# Patient Record
Sex: FEMALE | Race: WHITE
[De-identification: names, ages, dates, MRNs, and addresses within clinical notes are randomized per-mention and may not be internally consistent; named-entity substitution may affect disease eponyms.]

---

## 2017-04-06 ENCOUNTER — HOSPITAL ENCOUNTER (INPATIENT)
Dept: HOSPITAL 84 - D.ER | Age: 72
LOS: 4 days | Discharge: HOME | DRG: 189 | End: 2017-04-10
Admitting: FAMILY MEDICINE
Payer: MEDICARE

## 2017-04-06 VITALS — DIASTOLIC BLOOD PRESSURE: 44 MMHG | SYSTOLIC BLOOD PRESSURE: 104 MMHG

## 2017-04-06 VITALS — BODY MASS INDEX: 31.4 KG/M2 | BODY MASS INDEX: 31.5 KG/M2

## 2017-04-06 DIAGNOSIS — F32.9: ICD-10-CM

## 2017-04-06 DIAGNOSIS — E87.6: ICD-10-CM

## 2017-04-06 DIAGNOSIS — E11.65: ICD-10-CM

## 2017-04-06 DIAGNOSIS — K59.09: ICD-10-CM

## 2017-04-06 DIAGNOSIS — J44.0: ICD-10-CM

## 2017-04-06 DIAGNOSIS — J96.21: Primary | ICD-10-CM

## 2017-04-06 DIAGNOSIS — J18.1: ICD-10-CM

## 2017-04-06 DIAGNOSIS — Z99.81: ICD-10-CM

## 2017-04-06 DIAGNOSIS — J44.1: ICD-10-CM

## 2017-04-06 DIAGNOSIS — K21.9: ICD-10-CM

## 2017-04-06 DIAGNOSIS — F17.203: ICD-10-CM

## 2017-04-06 LAB
ALBUMIN SERPL-MCNC: 3.7 G/DL (ref 3.4–5)
ALP SERPL-CCNC: 76 U/L (ref 46–116)
ALT SERPL-CCNC: 15 U/L (ref 10–68)
ANION GAP SERPL CALC-SCNC: 8.4 MMOL/L (ref 8–16)
BASOPHILS NFR BLD AUTO: 0.1 % (ref 0–2)
BILIRUB SERPL-MCNC: 0.24 MG/DL (ref 0.2–1.3)
BUN SERPL-MCNC: 10 MG/DL (ref 7–18)
CALCIUM SERPL-MCNC: 8.8 MG/DL (ref 8.5–10.1)
CHLORIDE SERPL-SCNC: 97 MMOL/L (ref 98–107)
CO2 SERPL-SCNC: 34.7 MMOL/L (ref 21–32)
CREAT SERPL-MCNC: 0.8 MG/DL (ref 0.6–1.3)
EOSINOPHIL NFR BLD: 0.2 % (ref 0–7)
ERYTHROCYTE [DISTWIDTH] IN BLOOD BY AUTOMATED COUNT: 13.2 % (ref 11.5–14.5)
GLOBULIN SER-MCNC: 3.1 G/L
GLUCOSE SERPL-MCNC: 103 MG/DL (ref 74–106)
HCT VFR BLD CALC: 39.5 % (ref 36–48)
HGB BLD-MCNC: 13.1 G/DL (ref 12–16)
IMM GRANULOCYTES NFR BLD: 0.2 % (ref 0–5)
LYMPHOCYTES NFR BLD AUTO: 10.9 % (ref 15–50)
MCH RBC QN AUTO: 30.2 PG (ref 26–34)
MCHC RBC AUTO-ENTMCNC: 33.2 G/DL (ref 31–37)
MCV RBC: 91 FL (ref 80–100)
MONOCYTES NFR BLD: 8.4 % (ref 2–11)
NEUTROPHILS NFR BLD AUTO: 80.2 % (ref 40–80)
NT-PROBNP SERPL-MCNC: 297 PG/ML (ref 0–125)
OSMOLALITY SERPL CALC.SUM OF ELEC: 272 MOSM/KG (ref 275–300)
PLATELET # BLD: 304 10X3/UL (ref 130–400)
PMV BLD AUTO: 9.8 FL (ref 7.4–10.4)
POTASSIUM SERPL-SCNC: 3.1 MMOL/L (ref 3.5–5.1)
PROT SERPL-MCNC: 6.8 G/DL (ref 6.4–8.2)
RBC # BLD AUTO: 4.34 10X6/UL (ref 4–5.4)
SODIUM SERPL-SCNC: 137 MMOL/L (ref 136–145)
TROPONIN I SERPL-MCNC: < 0.017 NG/ML (ref 0–0.06)
WBC # BLD AUTO: 14.2 10X3/UL (ref 4.8–10.8)

## 2017-04-07 VITALS — DIASTOLIC BLOOD PRESSURE: 55 MMHG | SYSTOLIC BLOOD PRESSURE: 114 MMHG

## 2017-04-07 VITALS — DIASTOLIC BLOOD PRESSURE: 61 MMHG | SYSTOLIC BLOOD PRESSURE: 117 MMHG

## 2017-04-07 VITALS — SYSTOLIC BLOOD PRESSURE: 99 MMHG | DIASTOLIC BLOOD PRESSURE: 50 MMHG

## 2017-04-07 VITALS — DIASTOLIC BLOOD PRESSURE: 61 MMHG | SYSTOLIC BLOOD PRESSURE: 107 MMHG

## 2017-04-07 LAB
ANION GAP SERPL CALC-SCNC: 9.2 MMOL/L (ref 8–16)
BASOPHILS NFR BLD AUTO: 0 % (ref 0–2)
BUN SERPL-MCNC: 14 MG/DL (ref 7–18)
CALCIUM SERPL-MCNC: 9.2 MG/DL (ref 8.5–10.1)
CHLORIDE SERPL-SCNC: 95 MMOL/L (ref 98–107)
CO2 SERPL-SCNC: 34.8 MMOL/L (ref 21–32)
CREAT SERPL-MCNC: 0.9 MG/DL (ref 0.6–1.3)
EOSINOPHIL NFR BLD: 0 % (ref 0–7)
ERYTHROCYTE [DISTWIDTH] IN BLOOD BY AUTOMATED COUNT: 13.2 % (ref 11.5–14.5)
GLUCOSE SERPL-MCNC: 171 MG/DL (ref 74–106)
HCT VFR BLD CALC: 37.8 % (ref 36–48)
HGB BLD-MCNC: 12.3 G/DL (ref 12–16)
IMM GRANULOCYTES NFR BLD: 0.3 % (ref 0–5)
LYMPHOCYTES NFR BLD AUTO: 8.4 % (ref 15–50)
MCH RBC QN AUTO: 29.7 PG (ref 26–34)
MCHC RBC AUTO-ENTMCNC: 32.5 G/DL (ref 31–37)
MCV RBC: 91.3 FL (ref 80–100)
MONOCYTES NFR BLD: 1 % (ref 2–11)
NEUTROPHILS NFR BLD AUTO: 90.3 % (ref 40–80)
OSMOLALITY SERPL CALC.SUM OF ELEC: 274 MOSM/KG (ref 275–300)
PLATELET # BLD: 298 10X3/UL (ref 130–400)
PMV BLD AUTO: 10.3 FL (ref 7.4–10.4)
POTASSIUM SERPL-SCNC: 4 MMOL/L (ref 3.5–5.1)
RBC # BLD AUTO: 4.14 10X6/UL (ref 4–5.4)
SODIUM SERPL-SCNC: 135 MMOL/L (ref 136–145)
WBC # BLD AUTO: 11.8 10X3/UL (ref 4.8–10.8)

## 2017-04-07 NOTE — NUR
SITTING UP ON SIDE OF BED ALERT AND EATING BREAKFAST. TOLERATING WELL.
SCHEDULED MEDICATION ADMINISTERED. DENIES NEEDS. BED IN LOW POSITION. CALL
LIGHT IN REACH.

## 2017-04-07 NOTE — NUR
ASSESSED AT THE TIME OF ADMIT. SHE IS ALERT AND ORIENTED, ABLE TO VERBALIZE
NEEDS. SHE IS ABLE TO GET UP TO THE [id number] TO VOID AND SHE IS WEARING O2 AT
2 LITERS. RESPIRATORY HAS BEEN GIVING HER TREATMENTS TO HELP AND SHE HAS BEEN
COUGHTING UP ALOT OF TAN TO [facility]. MEDS WERE ORDERED BY MD WHEN CALLED
FOR THE NIGHT WITH ONE PAIN PILL TAKEN AT BEDTIME. THE BED IS LOW, RAILS UP
X'S 2 AND THE CALL LIGHT AT HAND.

## 2017-04-07 NOTE — NUR
PATIENT PLACED ON TELEMETRY. MONITOR TECH REPORTS PATIENT RUNNING BETWEEN
NORMAL SINUS RHYTHM AND SINUS TACH

## 2017-04-07 NOTE — NUR
INCENTIVE SPIROMETER AND USE ON TEACHING PROVIDED. PATIENT DEMONSTRATES
CORRECT USE. DENIES NEEDS. WILL CONTINUE TO MONITOR. CALL LIGHT IN REACH.

## 2017-04-07 NOTE — NUR
PATIENT RECEIVED SITTING UP ON SIDE OF BED ALERT. RESPIRATIONS EVEN AND
UNLABORED. INGE NURSING STUDENT AT BEDSIDE. DENIES NEEDS. BED IN LOW POSITION.
CALL LIGHT IN REACH.

## 2017-04-07 NOTE — NUR
Patient Name: CRYSTAL RANGEL                                   Admission Status:
ER
Accout number: [id number]                            Admission Date:
2017
: 1945                                                      Admission
Diagnosis:CHRONIC OBSTRUCTIVE PULMON DISEASE W ACUTE LOWER RESP I
Attending: MARICARMEN                                              Current LOS:
 1
 
Anticipated DC Date:
2017
Planned Disposition: Home
Primary Insurance: [employer]ICARE ADV
 
 
Discharge Planning Comments:
CM MET WITH PATIENT REGARDING D/C NEEDS AND PLANS. PATIENT STATED SHE LIVES
WITH HER GRANDSON (JOSHUA) AND HE WILL DRIVE HER HOME AT DISCHARGE. PATIENT
STATED THERE ARE 3 STEPS W/O RAILS TO ENTER HOME AND FEW INSIDE. PATIENT IS
INDEPENDENT WITH HER CARE AND GREGORY HELPS WITH HER MEDICATIONS. PATIENTS
PCP IS DR. MURILLO IN [place] [facility]. PATIENT STATED SHE HAS A WALKER, SHOWER CHAIR, OXYGEN, PORTABLE O2, AND
NEBULIZER AT HOME. PATIENT STATED SHE HAS NEVER HAD HOME HEALTH AND DOES NOT
WANT IT NOW. CM WILL CONTINUE TO FOLLOW PATIENT WITH D/C NEEDS AND PLANS.
 
 
PCP  DR. MURILLO  IN [facility] AT [facility]  [phone]
JOSHUA CASH)  [phone]
 
 
 
 
 
: Leonor Perdomo
 
Is the patient Alert and Oriented? Yes  0
* How many steps to enter\exit or inside your home? 3 W/O RAIL  0
* PCP DR. MURILLO IN [place]  0
* Pharmacy [facility]  0
* Preadmission Environment Home with Family  0
* ADLs Independent  0
* Equipment Nebulizer
Oxygen
Shower Chair
Walker  0
* Other Equipment PORTABLE O2  0
* List name and contact numbers for known caregivers / representatives who
currently or will assist patient after discharge: JOSHUA CASH)
[phone]  0
* Community resources currently utilized None  0
* Additional services required to return to the preadmission environment? Yes
0
* Can the patient safely return to the preadmission environment? Yes  0
* Has this patient been hospitalized within the prior 30 days at any hospital?
No  0
    Grand Total:  0

## 2017-04-07 NOTE — NUR
PATIENT IN LOW LORD POSITION RESTING QUIETLY WITH EYES CLOSED. RESPIRATIONS
EVEN AND UNLABORED. SIDE RAILS UP X2. BED IN LOW POSITION. CALL LIGHT IN
REACH.

## 2017-04-08 VITALS — SYSTOLIC BLOOD PRESSURE: 103 MMHG | DIASTOLIC BLOOD PRESSURE: 49 MMHG

## 2017-04-08 VITALS — SYSTOLIC BLOOD PRESSURE: 135 MMHG | DIASTOLIC BLOOD PRESSURE: 51 MMHG

## 2017-04-08 VITALS — DIASTOLIC BLOOD PRESSURE: 46 MMHG | SYSTOLIC BLOOD PRESSURE: 109 MMHG

## 2017-04-08 VITALS — SYSTOLIC BLOOD PRESSURE: 107 MMHG | DIASTOLIC BLOOD PRESSURE: 56 MMHG

## 2017-04-08 VITALS — SYSTOLIC BLOOD PRESSURE: 115 MMHG | DIASTOLIC BLOOD PRESSURE: 63 MMHG

## 2017-04-08 VITALS — SYSTOLIC BLOOD PRESSURE: 120 MMHG | DIASTOLIC BLOOD PRESSURE: 59 MMHG

## 2017-04-08 LAB
ALBUMIN SERPL-MCNC: 3.4 G/DL (ref 3.4–5)
ALP SERPL-CCNC: 69 U/L (ref 46–116)
ALT SERPL-CCNC: 14 U/L (ref 10–68)
ANION GAP SERPL CALC-SCNC: 11 MMOL/L (ref 8–16)
BASOPHILS NFR BLD AUTO: 0.1 % (ref 0–2)
BILIRUB SERPL-MCNC: 0.15 MG/DL (ref 0.2–1.3)
BUN SERPL-MCNC: 14 MG/DL (ref 7–18)
CALCIUM SERPL-MCNC: 9 MG/DL (ref 8.5–10.1)
CHLORIDE SERPL-SCNC: 92 MMOL/L (ref 98–107)
CO2 SERPL-SCNC: 33.4 MMOL/L (ref 21–32)
CREAT SERPL-MCNC: 0.8 MG/DL (ref 0.6–1.3)
EOSINOPHIL NFR BLD: 0 % (ref 0–7)
ERYTHROCYTE [DISTWIDTH] IN BLOOD BY AUTOMATED COUNT: 13.1 % (ref 11.5–14.5)
GLOBULIN SER-MCNC: 3.8 G/L
GLUCOSE SERPL-MCNC: 157 MG/DL (ref 74–106)
HCT VFR BLD CALC: 38 % (ref 36–48)
HGB BLD-MCNC: 12.3 G/DL (ref 12–16)
IMM GRANULOCYTES NFR BLD: 0.7 % (ref 0–5)
LYMPHOCYTES NFR BLD AUTO: 6 % (ref 15–50)
MCH RBC QN AUTO: 29.6 PG (ref 26–34)
MCHC RBC AUTO-ENTMCNC: 32.4 G/DL (ref 31–37)
MCV RBC: 91.6 FL (ref 80–100)
MONOCYTES NFR BLD: 3.4 % (ref 2–11)
NEUTROPHILS NFR BLD AUTO: 89.8 % (ref 40–80)
OSMOLALITY SERPL CALC.SUM OF ELEC: 268 MOSM/KG (ref 275–300)
PLATELET # BLD: 326 10X3/UL (ref 130–400)
PMV BLD AUTO: 9.9 FL (ref 7.4–10.4)
POTASSIUM SERPL-SCNC: 4.4 MMOL/L (ref 3.5–5.1)
PROT SERPL-MCNC: 7.2 G/DL (ref 6.4–8.2)
RBC # BLD AUTO: 4.15 10X6/UL (ref 4–5.4)
SODIUM SERPL-SCNC: 132 MMOL/L (ref 136–145)
WBC # BLD AUTO: 15.5 10X3/UL (ref 4.8–10.8)

## 2017-04-08 NOTE — NUR
PATIENT SITING UP ON THE SIDE OF HER BED.  PATIENT PLAYING WITH A DECK OF
CARDS.  PATIENT REATES HER PAIN LEVEL A "3" ON A 0-10 SCALE.  PATIENT DENIES
ANY NEEDS AT PRESENT TIME.  CALL LIGHT IN PATIENT'S REACH.  WILL MONITOR
PATIENT.

## 2017-04-08 NOTE — NUR
PATIENT SITTING UP ON THE SIDE OF THE BED.  PATIENT IS AWAKE, ALERT, AND
ORIENTED X4.  ASSESSMENT COMPLETED.  SEE FLOWSHEET FOR ANY DETAILS.  TELEMETRY
IN PLACE AND SHOWING NORMAL SINUS RHYTHM WITH PVC'S AT A RATE OF 82.  OXYGEN
IN PLACE AT 2.5 L PER NC.  PATIENT RATES HER PAIN LEVEL A "3" ON A 0-10 SCALE.
PATIENT DENIES ANY NEEDS AT PRESENT TIME.  SCHEDULED MORNING MEDICATIONS GIVEN
TO PATIENT.  PATIENT TOLERATED HER MEDICATIONS WELL WITH WATER.  CALL LIGHT IN
PATIENT'S REACH.  WILL MONITOR.

## 2017-04-08 NOTE — NUR
SL PATIENT'S IV. PATIENT DENIES OTHER NEEDS AT THIS TIME. BED IN LOWEST
POSITION AND CALL LIGHT WITHIN REACH. ENCOURAGED PATIENT TO CALL IF SHE HAS
OTHER NEEDS.

## 2017-04-08 NOTE — NUR
PATIENT RESTING QUIETLY IN THE BED.  AWAKENS TO VERBAL STIMULI.  FSBS 241.  4
UNITS OF HUMALOG SLIDING SCALE INSULIN GIVEN TO PATIENT IN HER LEFT ARM.
PATIENT TOLERATED WELL.  PATIENT DENIES ANY NEEDS AT PRESENT TIME.  CALL LIGHT
IN PATIENT'S REACH.  WILL MONITOR PATIENT.

## 2017-04-09 VITALS — SYSTOLIC BLOOD PRESSURE: 118 MMHG | DIASTOLIC BLOOD PRESSURE: 61 MMHG

## 2017-04-09 VITALS — SYSTOLIC BLOOD PRESSURE: 114 MMHG | DIASTOLIC BLOOD PRESSURE: 70 MMHG

## 2017-04-09 VITALS — SYSTOLIC BLOOD PRESSURE: 131 MMHG | DIASTOLIC BLOOD PRESSURE: 71 MMHG

## 2017-04-09 VITALS — SYSTOLIC BLOOD PRESSURE: 119 MMHG | DIASTOLIC BLOOD PRESSURE: 59 MMHG

## 2017-04-09 VITALS — DIASTOLIC BLOOD PRESSURE: 54 MMHG | SYSTOLIC BLOOD PRESSURE: 114 MMHG

## 2017-04-09 VITALS — SYSTOLIC BLOOD PRESSURE: 114 MMHG | DIASTOLIC BLOOD PRESSURE: 61 MMHG

## 2017-04-09 LAB
ALBUMIN SERPL-MCNC: 3.2 G/DL (ref 3.4–5)
ALP SERPL-CCNC: 58 U/L (ref 46–116)
ALT SERPL-CCNC: 15 U/L (ref 10–68)
ANION GAP SERPL CALC-SCNC: 8.6 MMOL/L (ref 8–16)
BASOPHILS NFR BLD AUTO: 0.1 % (ref 0–2)
BILIRUB SERPL-MCNC: 0.16 MG/DL (ref 0.2–1.3)
BUN SERPL-MCNC: 16 MG/DL (ref 7–18)
CALCIUM SERPL-MCNC: 8.8 MG/DL (ref 8.5–10.1)
CHLORIDE SERPL-SCNC: 92 MMOL/L (ref 98–107)
CO2 SERPL-SCNC: 36.7 MMOL/L (ref 21–32)
CREAT SERPL-MCNC: 0.9 MG/DL (ref 0.6–1.3)
EOSINOPHIL NFR BLD: 0 % (ref 0–7)
ERYTHROCYTE [DISTWIDTH] IN BLOOD BY AUTOMATED COUNT: 13.1 % (ref 11.5–14.5)
GLOBULIN SER-MCNC: 3.5 G/L
GLUCOSE SERPL-MCNC: 127 MG/DL (ref 74–106)
HCT VFR BLD CALC: 37.6 % (ref 36–48)
HGB BLD-MCNC: 12.6 G/DL (ref 12–16)
IMM GRANULOCYTES NFR BLD: 1.2 % (ref 0–5)
LYMPHOCYTES NFR BLD AUTO: 6.3 % (ref 15–50)
MCH RBC QN AUTO: 30.1 PG (ref 26–34)
MCHC RBC AUTO-ENTMCNC: 33.5 G/DL (ref 31–37)
MCV RBC: 90 FL (ref 80–100)
MONOCYTES NFR BLD: 3.9 % (ref 2–11)
NEUTROPHILS NFR BLD AUTO: 88.5 % (ref 40–80)
OSMOLALITY SERPL CALC.SUM OF ELEC: 268 MOSM/KG (ref 275–300)
PLATELET # BLD: 328 10X3/UL (ref 130–400)
PMV BLD AUTO: 9.9 FL (ref 7.4–10.4)
POTASSIUM SERPL-SCNC: 4.3 MMOL/L (ref 3.5–5.1)
PROT SERPL-MCNC: 6.7 G/DL (ref 6.4–8.2)
RBC # BLD AUTO: 4.18 10X6/UL (ref 4–5.4)
SODIUM SERPL-SCNC: 133 MMOL/L (ref 136–145)
WBC # BLD AUTO: 12.9 10X3/UL (ref 4.8–10.8)

## 2017-04-09 NOTE — NUR
ADMINISTERED A FLEETS ENEMA. PATIENT INSTANTLY HAD RESULTS OF 2 SMALL STOOLS
EACH ABOUT THE SIZE OF A QUARTER.

## 2017-04-09 NOTE — NUR
PATIENT RESTING IN BED AND DENIES NEEDS AT THIS TIME. BED IN LOWEST POSITION
AND CALL LIGHT WITHIN REACH. ENCOURAGED PATIENT TO CALL IF SHE HAS FURTHER
NEEDS.

## 2017-04-10 VITALS — DIASTOLIC BLOOD PRESSURE: 73 MMHG | SYSTOLIC BLOOD PRESSURE: 138 MMHG

## 2017-04-10 VITALS — DIASTOLIC BLOOD PRESSURE: 66 MMHG | SYSTOLIC BLOOD PRESSURE: 109 MMHG

## 2017-04-10 VITALS — SYSTOLIC BLOOD PRESSURE: 120 MMHG | DIASTOLIC BLOOD PRESSURE: 62 MMHG

## 2017-04-10 VITALS — SYSTOLIC BLOOD PRESSURE: 127 MMHG | DIASTOLIC BLOOD PRESSURE: 65 MMHG

## 2017-04-10 LAB
ALBUMIN SERPL-MCNC: 3.2 G/DL (ref 3.4–5)
ALP SERPL-CCNC: 53 U/L (ref 46–116)
ALT SERPL-CCNC: 56 U/L (ref 10–68)
ANION GAP SERPL CALC-SCNC: 7.8 MMOL/L (ref 8–16)
BASOPHILS NFR BLD AUTO: 0.1 % (ref 0–2)
BILIRUB SERPL-MCNC: 0.21 MG/DL (ref 0.2–1.3)
BUN SERPL-MCNC: 20 MG/DL (ref 7–18)
CALCIUM SERPL-MCNC: 9 MG/DL (ref 8.5–10.1)
CHLORIDE SERPL-SCNC: 94 MMOL/L (ref 98–107)
CO2 SERPL-SCNC: 37.6 MMOL/L (ref 21–32)
CREAT SERPL-MCNC: 0.9 MG/DL (ref 0.6–1.3)
EOSINOPHIL NFR BLD: 0 % (ref 0–7)
ERYTHROCYTE [DISTWIDTH] IN BLOOD BY AUTOMATED COUNT: 13.3 % (ref 11.5–14.5)
GLOBULIN SER-MCNC: 3.4 G/L
GLUCOSE SERPL-MCNC: 125 MG/DL (ref 74–106)
HCT VFR BLD CALC: 37.8 % (ref 36–48)
HGB BLD-MCNC: 12.4 G/DL (ref 12–16)
IMM GRANULOCYTES NFR BLD: 1.4 % (ref 0–5)
LYMPHOCYTES NFR BLD AUTO: 9.7 % (ref 15–50)
MCH RBC QN AUTO: 29.5 PG (ref 26–34)
MCHC RBC AUTO-ENTMCNC: 32.8 G/DL (ref 31–37)
MCV RBC: 89.8 FL (ref 80–100)
MONOCYTES NFR BLD: 5 % (ref 2–11)
NEUTROPHILS NFR BLD AUTO: 83.8 % (ref 40–80)
OSMOLALITY SERPL CALC.SUM OF ELEC: 273 MOSM/KG (ref 275–300)
PLATELET # BLD: 322 10X3/UL (ref 130–400)
PMV BLD AUTO: 9.6 FL (ref 7.4–10.4)
POTASSIUM SERPL-SCNC: 4.4 MMOL/L (ref 3.5–5.1)
PROT SERPL-MCNC: 6.6 G/DL (ref 6.4–8.2)
RBC # BLD AUTO: 4.21 10X6/UL (ref 4–5.4)
SODIUM SERPL-SCNC: 135 MMOL/L (ref 136–145)
WBC # BLD AUTO: 11.5 10X3/UL (ref 4.8–10.8)

## 2017-04-10 NOTE — NUR
BROUGHT PATIENT HER MORNING COFFEE AND WATER PER HER REQUEST. PATIENT IS
SITTING UP ON THE SIDE OF HER BED AND DENIES OTHER NEEDS AT THIS TIME. BED IN
LOWEST POSITION AND CALL LIGHT WITHIN REACH. ENCOURAGED PATIENT TO CALL IF SHE
HAS FURTHER NEEDS.

## 2017-04-10 NOTE — NUR
CM REASSESSMENT NOTE: PATIENT IS DISCHARGING TODAY-GRANDSONS AT BEDSIDE TO
DRIVE HER HOME. CM ASKED WHERE HER PORTABLE OXYGEN WAS AND SHE STATED SHE DOES
NOT USE IT ALL THE TIME AND DID NOT NEED IT TO GO HOME ON. PATIENT REF. HOME
HEALTH OR ANY OTHER NEEDS FOR DISCHARGE.

## 2017-04-10 NOTE — NUR
PATIENT IS AWAKE, ALERT AND ORIENTED X'S 4. RESPIRATIONS ARE UNLABORED ON
2L/MIN NASAL CANNULA. PATIENT IS SITTING UP ON THE SIDE OF THE BED. BED IN
LOWEST POSITION, CALL LIGHT IN REACH. BED RAILS UP X'S 2. PATIENT DENIES PAIN
AND NEEDS AT THIS TIME. PATIENT STATED SHE HAD A LARGE BOWEL MOVEMENT LAST
NIGHT AND FEELS BETTER TODAY.

## 2017-04-24 ENCOUNTER — HOSPITAL ENCOUNTER (EMERGENCY)
Dept: HOSPITAL 84 - D.ER | Age: 72
Discharge: HOME | End: 2017-04-24
Payer: MEDICARE

## 2017-04-24 DIAGNOSIS — E87.6: ICD-10-CM

## 2017-04-24 DIAGNOSIS — J44.1: Primary | ICD-10-CM

## 2017-04-24 DIAGNOSIS — F17.200: ICD-10-CM

## 2017-04-24 LAB
ALBUMIN SERPL-MCNC: 3.1 G/DL (ref 3.4–5)
ALP SERPL-CCNC: 53 U/L (ref 46–116)
ALT SERPL-CCNC: 19 U/L (ref 10–68)
ANION GAP SERPL CALC-SCNC: 9.2 MMOL/L (ref 8–16)
BASOPHILS NFR BLD AUTO: 0.1 % (ref 0–2)
BILIRUB SERPL-MCNC: 0.63 MG/DL (ref 0.2–1.3)
BUN SERPL-MCNC: 20 MG/DL (ref 7–18)
CALCIUM SERPL-MCNC: 8.9 MG/DL (ref 8.5–10.1)
CHLORIDE SERPL-SCNC: 96 MMOL/L (ref 98–107)
CHOLEST/HDLC SERPL: 2.2 RATIO (ref 2.3–4.1)
CK MB SERPL-MCNC: 1.7 U/L (ref 0–3.6)
CK SERPL-CCNC: 62 UL (ref 21–215)
CO2 SERPL-SCNC: 36.2 MMOL/L (ref 21–32)
CREAT SERPL-MCNC: 1.1 MG/DL (ref 0.6–1.3)
EOSINOPHIL NFR BLD: 0.6 % (ref 0–7)
ERYTHROCYTE [DISTWIDTH] IN BLOOD BY AUTOMATED COUNT: 13.7 % (ref 11.5–14.5)
GLOBULIN SER-MCNC: 3 G/L
GLUCOSE SERPL-MCNC: 201 MG/DL (ref 74–106)
HCT VFR BLD CALC: 40.1 % (ref 36–48)
HDLC SERPL-MCNC: 78 MG/DL (ref 32–96)
HGB BLD-MCNC: 13.2 G/DL (ref 12–16)
IMM GRANULOCYTES NFR BLD: 0.5 % (ref 0–5)
LDL-HDL RATIO: 1 RATIO (ref 1.5–3.5)
LDLC SERPL-MCNC: 77 MG/DL (ref 0–100)
LYMPHOCYTES NFR BLD AUTO: 11.9 % (ref 15–50)
MCH RBC QN AUTO: 29.8 PG (ref 26–34)
MCHC RBC AUTO-ENTMCNC: 32.9 G/DL (ref 31–37)
MCV RBC: 90.5 FL (ref 80–100)
MONOCYTES NFR BLD: 3.6 % (ref 2–11)
NEUTROPHILS NFR BLD AUTO: 83.3 % (ref 40–80)
OSMOLALITY SERPL CALC.SUM OF ELEC: 284 MOSM/KG (ref 275–300)
PLATELET # BLD: 281 10X3/UL (ref 130–400)
PMV BLD AUTO: 9.5 FL (ref 7.4–10.4)
POTASSIUM SERPL-SCNC: 3.4 MMOL/L (ref 3.5–5.1)
PROT SERPL-MCNC: 6.1 G/DL (ref 6.4–8.2)
RBC # BLD AUTO: 4.43 10X6/UL (ref 4–5.4)
SODIUM SERPL-SCNC: 138 MMOL/L (ref 136–145)
TRIGL SERPL-MCNC: 102 MG/DL (ref 30–200)
TROPONIN I SERPL-MCNC: < 0.017 NG/ML (ref 0–0.06)
WBC # BLD AUTO: 10.9 10X3/UL (ref 4.8–10.8)

## 2017-06-01 NOTE — CN
PATIENT NAME:CRYSTAL MORRIS                                  MEDICAL RECORD: [id number]
: 41                                              LOCATION:[id number]
ADMIT DATE: 17                                       ACCOUNT: [id number]
CONSULTING PHYSICIAN:    DANILO LEWIS MD              
                                               
REFERRING PHYSICIAN:     ADAM VELASCO MD           
 
 
DATE OF CONSULTATION:  2017
 
Pulmonary Consultation
 
CONSULT REQUESTING PHYSICIAN:  Dr. Adam Velasco.
 
REASON FOR CONSULTATION:  Acute exacerbation of chronic obstructive pulmonary
disease, pneumonia.
 
HISTORY OF PRESENT ILLNESS:  Ms. Morris is a 71-year-old  female, who
has a history of COPD.  She has respiratory failure.  She was on mechanical
ventilation in October last year.  At [facility], she did well.  Now a few days ago,
she developed cold and flu-like symptoms.  She got some medication
over-the-counter, but she started coughing yellow-green color sputum.  Also, she
has worsening shortness of breath.  Yesterday, she has a fall and the patient
came into the ER and evaluation.  She was found that she has leukocytosis and
pneumonia in the lingula.  She also has shortness of breath.  She is coughing,
wheezing and shortness of breath.
 
REVIEW OF SYSTEMS:  Mainly in the history of present illness.
 
PAST MEDICAL HISTORY:
1.  COPD.
2.  Gastroesophageal reflux disease.
3.  History of depression.
4.  History of respiratory failure in October last year and she was hospitalized
at [facility] and she was on mechanical ventilation for 11 days.
 
PAST SURGICAL HISTORY:
1.  Cholecystectomy.
2.  Hysterectomy.
3.  Bilateral feet, spur removal.
 
ALLERGIES:  SHE IS ALLERGIC TO LEVOFLOXACIN, CODEINE, SULFA, MORPHINE.
 
PERSONAL AND SOCIAL HISTORY:  The patient still continue from half pack to 1
pack per day.  She is smoking for more than 50 years.  She is a nondrinker.
 
FAMILY HISTORY:  Noncontributory.
 
PHYSICAL EXAMINATION:
GENERAL:  Now, the patient is lying comfortably.  She is not in acute distress.
VITAL SIGNS:  The blood pressure is 107/61, pulse is 87, respirations 20,
temperature 97.3, SPO2 is 92% on 2 liters nasal cannula.
HEENT:  Conjunctivae pink, sclerae nonicteric.
NECK:  Supple, no JVD.
CHEST:  The chest excursion is minimal on both sides.  There is wheeze on
forceful expiration.
HEART:  Rate and rhythm regular, normal sound, no murmur.
ABDOMEN:  Soft, bowel sounds present.  No hepatosplenomegaly.
 
 
 
CONSULT REPORT                                 [id number]    CRYSTAL MORRIS                
 
 
RECTAL:  Deferred.
EXTREMITIES:  No cyanosis, no clubbing, no pedal edema.
SKIN:  Warm, normal turgor.
CENTRAL NERVOUS SYSTEM:  The patient is awake and alert.  There is no obvious
cranial nerve abnormality.  The gait was not tested.
 
CHEST RADIOGRAPH:  There is infiltrate in the lingula.
 
OTHER LABORATORY DATA:  CBC:  WBC 14.2, hemoglobin 13.1, hematocrit is 39.5, the
platelet count 304.  Chemistry:  Sodium 135, potassium is 4, chloride 95,
bicarbonate is 34.8.  The BUN is 14, creatinine is 0.9 and glucose 171.
 
IMPRESSION:
1.  Acute-on-chronic hypoxic respiratory failure.
2.  Acute exacerbation of chronic obstructive pulmonary disease.
3.  Pneumonia, left lingular region consistent with community-acquired
pneumonia.
4.  Tobacco dependence syndrome.
5.  Gastroesophageal reflux disease.
 
RECOMMENDATION:
1.  Continue Zithromax and Rocephin IV.
2.  Methylprednisolone IV.
3.  Albuterol and ipratropium nebulizer.
4.  Start on Brovana and budesonide nebulizer.
5.  Discontinue the Advair.
6.  Check alpha 1 level.
7.  Continue supplemental oxygen, DVT prophylaxis.
8.  Follow up labs and chest radiograph.
 
Dr. Velasco, once again thanks for involving me in the care of Ms. Morris.
 
TRANSINT:[id number] Voice Confirmation ID: [id number] DOCUMENT ID: [id number]
                                           
                                           DANILO LEWIS MD              
 
 
 
Electronically Signed by DANILO LEWIS on 17 at 1211
 
 
 
 
 
 
 
 
CC: ADAM VELASCO MD                                         [phone]
DICTATION DATE: 17 1408     :     173      DIS IN  
                                                                      04/10/17
[facility]                                          
[address] [place] [address]

## 2017-06-28 ENCOUNTER — HOSPITAL ENCOUNTER (OUTPATIENT)
Dept: HOSPITAL 84 - D.ER | Age: 72
Setting detail: OBSERVATION
LOS: 2 days | Discharge: HOME HEALTH | End: 2017-06-30
Attending: FAMILY MEDICINE | Admitting: FAMILY MEDICINE
Payer: MEDICARE

## 2017-06-28 VITALS
BODY MASS INDEX: 28.12 KG/M2 | HEIGHT: 65 IN | WEIGHT: 168.79 LBS | BODY MASS INDEX: 28.12 KG/M2 | WEIGHT: 168.79 LBS | BODY MASS INDEX: 28.12 KG/M2 | HEIGHT: 65 IN

## 2017-06-28 VITALS — SYSTOLIC BLOOD PRESSURE: 149 MMHG | DIASTOLIC BLOOD PRESSURE: 62 MMHG

## 2017-06-28 VITALS — DIASTOLIC BLOOD PRESSURE: 63 MMHG | SYSTOLIC BLOOD PRESSURE: 108 MMHG

## 2017-06-28 VITALS — SYSTOLIC BLOOD PRESSURE: 104 MMHG | DIASTOLIC BLOOD PRESSURE: 48 MMHG

## 2017-06-28 VITALS — DIASTOLIC BLOOD PRESSURE: 62 MMHG | SYSTOLIC BLOOD PRESSURE: 108 MMHG

## 2017-06-28 DIAGNOSIS — T36.1X5A: ICD-10-CM

## 2017-06-28 DIAGNOSIS — Z72.0: ICD-10-CM

## 2017-06-28 DIAGNOSIS — R00.2: ICD-10-CM

## 2017-06-28 DIAGNOSIS — E11.65: ICD-10-CM

## 2017-06-28 DIAGNOSIS — I50.9: ICD-10-CM

## 2017-06-28 DIAGNOSIS — L29.9: ICD-10-CM

## 2017-06-28 DIAGNOSIS — J44.1: Primary | ICD-10-CM

## 2017-06-28 LAB
ALBUMIN SERPL-MCNC: 3.4 G/DL (ref 3.4–5)
ALP SERPL-CCNC: 55 U/L (ref 46–116)
ALT SERPL-CCNC: 8 U/L (ref 10–68)
ANION GAP SERPL CALC-SCNC: 8.7 MMOL/L (ref 8–16)
BASOPHILS NFR BLD AUTO: 0.1 % (ref 0–2)
BILIRUB SERPL-MCNC: 0.9 MG/DL (ref 0.2–1.3)
BUN SERPL-MCNC: 16 MG/DL (ref 7–18)
CALCIUM SERPL-MCNC: 8.8 MG/DL (ref 8.5–10.1)
CHLORIDE SERPL-SCNC: 102 MMOL/L (ref 98–107)
CK MB SERPL-MCNC: 0.7 U/L (ref 0–3.6)
CK SERPL-CCNC: 70 UL (ref 21–215)
CO2 SERPL-SCNC: 30.8 MMOL/L (ref 21–32)
CREAT SERPL-MCNC: 0.8 MG/DL (ref 0.6–1.3)
EOSINOPHIL NFR BLD: 0.2 % (ref 0–7)
ERYTHROCYTE [DISTWIDTH] IN BLOOD BY AUTOMATED COUNT: 13.7 % (ref 11.5–14.5)
GLOBULIN SER-MCNC: 3.1 G/L
GLUCOSE SERPL-MCNC: 113 MG/DL (ref 74–106)
HCT VFR BLD CALC: 37.7 % (ref 36–48)
HGB BLD-MCNC: 12.7 G/DL (ref 12–16)
IMM GRANULOCYTES NFR BLD: 0.2 % (ref 0–5)
LYMPHOCYTES NFR BLD AUTO: 6.9 % (ref 15–50)
MCH RBC QN AUTO: 30.9 PG (ref 26–34)
MCHC RBC AUTO-ENTMCNC: 33.7 G/DL (ref 31–37)
MCV RBC: 91.7 FL (ref 80–100)
MONOCYTES NFR BLD: 6.9 % (ref 2–11)
NEUTROPHILS NFR BLD AUTO: 85.7 % (ref 40–80)
NT-PROBNP SERPL-MCNC: 554 PG/ML (ref 0–125)
OSMOLALITY SERPL CALC.SUM OF ELEC: 277 MOSM/KG (ref 275–300)
PLATELET # BLD: 238 10X3/UL (ref 130–400)
PMV BLD AUTO: 10 FL (ref 7.4–10.4)
POTASSIUM SERPL-SCNC: 3.5 MMOL/L (ref 3.5–5.1)
PROT SERPL-MCNC: 6.5 G/DL (ref 6.4–8.2)
RBC # BLD AUTO: 4.11 10X6/UL (ref 4–5.4)
SODIUM SERPL-SCNC: 138 MMOL/L (ref 136–145)
TROPONIN I SERPL-MCNC: < 0.017 NG/ML (ref 0–0.06)
TROPONIN I SERPL-MCNC: < 0.017 NG/ML (ref 0–0.06)
WBC # BLD AUTO: 16.8 10X3/UL (ref 4.8–10.8)

## 2017-06-28 NOTE — NUR
SITTING ON SIDE OF BED READING MAGAZINE. ALERT/ORIENTED X 4. IV IN RT WRIST
INTACT SL. 02 AT 2L/NC, SAT SHOWS 94%. ON TELEMETRY SHOWING 78 SR ON MONITOR.
REQUESTED SOME PUDDING AND COFFEE. ORIENTED TO CL FOR ANY NEEDS.

## 2017-06-29 VITALS — SYSTOLIC BLOOD PRESSURE: 122 MMHG | DIASTOLIC BLOOD PRESSURE: 72 MMHG

## 2017-06-29 VITALS — SYSTOLIC BLOOD PRESSURE: 111 MMHG | DIASTOLIC BLOOD PRESSURE: 60 MMHG

## 2017-06-29 VITALS — SYSTOLIC BLOOD PRESSURE: 115 MMHG | DIASTOLIC BLOOD PRESSURE: 58 MMHG

## 2017-06-29 VITALS — DIASTOLIC BLOOD PRESSURE: 67 MMHG | SYSTOLIC BLOOD PRESSURE: 109 MMHG

## 2017-06-29 VITALS — DIASTOLIC BLOOD PRESSURE: 70 MMHG | SYSTOLIC BLOOD PRESSURE: 123 MMHG

## 2017-06-29 LAB
ALBUMIN SERPL-MCNC: 3.6 G/DL (ref 3.4–5)
ALP SERPL-CCNC: 60 U/L (ref 46–116)
ALT SERPL-CCNC: 12 U/L (ref 10–68)
ANION GAP SERPL CALC-SCNC: 12.6 MMOL/L (ref 8–16)
BASOPHILS NFR BLD AUTO: 0.1 % (ref 0–2)
BILIRUB SERPL-MCNC: 0.35 MG/DL (ref 0.2–1.3)
BUN SERPL-MCNC: 19 MG/DL (ref 7–18)
CALCIUM SERPL-MCNC: 9.5 MG/DL (ref 8.5–10.1)
CHLORIDE SERPL-SCNC: 97 MMOL/L (ref 98–107)
CK MB SERPL-MCNC: 1.1 U/L (ref 0–3.6)
CK MB SERPL-MCNC: 1.5 U/L (ref 0–3.6)
CK SERPL-CCNC: 67 UL (ref 21–215)
CK SERPL-CCNC: 71 UL (ref 21–215)
CO2 SERPL-SCNC: 29.9 MMOL/L (ref 21–32)
CREAT SERPL-MCNC: 0.6 MG/DL (ref 0.6–1.3)
EOSINOPHIL NFR BLD: 0 % (ref 0–7)
ERYTHROCYTE [DISTWIDTH] IN BLOOD BY AUTOMATED COUNT: 13.6 % (ref 11.5–14.5)
GLOBULIN SER-MCNC: 3 G/L
GLUCOSE SERPL-MCNC: 135 MG/DL (ref 74–106)
HCT VFR BLD CALC: 36.8 % (ref 36–48)
HGB BLD-MCNC: 12.5 G/DL (ref 12–16)
IMM GRANULOCYTES NFR BLD: 0.4 % (ref 0–5)
LYMPHOCYTES NFR BLD AUTO: 5.8 % (ref 15–50)
MCH RBC QN AUTO: 30.5 PG (ref 26–34)
MCHC RBC AUTO-ENTMCNC: 34 G/DL (ref 31–37)
MCV RBC: 89.8 FL (ref 80–100)
MONOCYTES NFR BLD: 3.5 % (ref 2–11)
NEUTROPHILS NFR BLD AUTO: 90.2 % (ref 40–80)
OSMOLALITY SERPL CALC.SUM OF ELEC: 275 MOSM/KG (ref 275–300)
PLATELET # BLD: 261 10X3/UL (ref 130–400)
PMV BLD AUTO: 10.2 FL (ref 7.4–10.4)
POTASSIUM SERPL-SCNC: 3.5 MMOL/L (ref 3.5–5.1)
PROT SERPL-MCNC: 6.6 G/DL (ref 6.4–8.2)
RBC # BLD AUTO: 4.1 10X6/UL (ref 4–5.4)
SODIUM SERPL-SCNC: 136 MMOL/L (ref 136–145)
TROPONIN I SERPL-MCNC: < 0.017 NG/ML (ref 0–0.06)
TROPONIN I SERPL-MCNC: < 0.017 NG/ML (ref 0–0.06)
WBC # BLD AUTO: 17.8 10X3/UL (ref 4.8–10.8)

## 2017-06-29 NOTE — NUR
WALKING AROUND IN ROOM. ON 02 AT 2L/NC. RR 20 EVEN U/L. IV IN RT WRIST INTACT
SL. DENIES PAIN. REQUESTED MORE COFFEE.

## 2017-06-29 NOTE — NUR
Patient Name: CRYSTAL RANGEL Admission Status: ER
Accout number: [id number] Admission Date: 2017
: 1945 Admission Diagnosis:
Attending: KYREE Current LOS: 1
 
Anticipated DC Date: 2017
Planned Disposition: Home with Home Health
Primary Insurance: [employer] MEDICARE ADV
PLANNED EXTERNAL PROVIDER: [facility]
 
Discharge Planning Comments:
* Is the patient Alert and Oriented? Yes 0
* How many steps to enter\exit or inside your home? 2 0
* PCP CASEY FLOWERS 0
* Pharmacy [facility] 0
* Preadmission Environment Home with Family 0
* ADLs Independent 0
* Equipment Nebulizer
Oxygen
Tub Bench
Walker 0
* Other Equipment HOME AND PORTABLE OXYGEN
[facility] - MEDICAL EQUIPMENT PROVIDER 0
* List name and contact numbers for known caregivers / representatives who
currently or will assist patient after discharge: GREGORY FARIAS,
[phone] 0
* Community resources currently utilized Home Health 0
* Please name any agencies selected above. [facility], [phone] 0
* Additional services required to return to the preadmission environment? No 0
* Can the patient safely return to the preadmission environment? Yes 0
* Has this patient been hospitalized within the prior 30 days at any hospital?
No 0
 
CM MET WITH PT IN ROOM TO DISCUSS DISCHARGE PLANNING AND NEEDS. PT REPORTS
LIVING AT HOME INDEPENDENTLY WITH HER ADULT GRANDSON. PT REPORTS HAVING ALL
NEEDED MEDICAL EQUIPMENT FROM [facility], HER WALKER AND TUB BENCH ARE
BEING DELIVERED TODAY AS ORDERED BY HER PRIMARY CARE DOCTOR. PT HAS HOME
HEALTH WITH [facility] FOR NURSING AND PHYSICAL THERAPY ALREADY. PT DENIES
DISCHARGE NEEDS EXCEPT FOR HOME HEALTH, REPORTS HER GRANDSON WILL PICK HER UP
FOR DISCHARGE HOME. CM CALLED [facility], SPOKE TO OLEG AT
[phone], WHO VERIFIED PT ACTIVE AND ON HOLD FOR HOME HEALTH.
 
FOR DISCHARGE, CALL [facility] [phone] TO RESUME HOME HEALTH.
FAX DISCHARGE INFORMATION TO [facility] -[phone].
 
: Aubrey Stoll

## 2017-06-29 NOTE — NUR
DR. MELENDEZ WROTE SCRIPS AND IS OK WITH D/C.  SCRIPS IN CHART.
DR. WINSLOW PUT IN A NOTE AND
STATED "ATIPICAL CP, NON CARDIAC".  SPOKE WITH DR. DEJESUS AND HE SAID HE WOULD
PASS THE INFORMATION TO DR. GROVES

## 2017-06-29 NOTE — NUR
AM ROUNDS - PT SITTING ON SIDE OF BED SAYING SHE WOULD LIKE TO GO HOME.  NON
SKID SOCKS ON.  CALL HIGUEAR IN USE/REACH.  BED AT LOWEST POSITION.  SIDE RAILS
UP X2.  MONITOR SHOWING SR, HR 72.  O2 AT 2L VIA NC.  IV TO RIGHT WRIST, SL.
NO NEEDS AT THIS TIME.  WILL CONTINUE TO MONITOR

## 2017-06-30 VITALS — DIASTOLIC BLOOD PRESSURE: 62 MMHG | SYSTOLIC BLOOD PRESSURE: 115 MMHG

## 2017-06-30 VITALS — SYSTOLIC BLOOD PRESSURE: 120 MMHG | DIASTOLIC BLOOD PRESSURE: 61 MMHG

## 2017-06-30 VITALS — SYSTOLIC BLOOD PRESSURE: 108 MMHG | DIASTOLIC BLOOD PRESSURE: 49 MMHG

## 2017-06-30 LAB
ALBUMIN SERPL-MCNC: 3.2 G/DL (ref 3.4–5)
ALP SERPL-CCNC: 47 U/L (ref 46–116)
ALT SERPL-CCNC: 11 U/L (ref 10–68)
ANION GAP SERPL CALC-SCNC: 6.6 MMOL/L (ref 8–16)
BASOPHILS NFR BLD AUTO: 0.1 % (ref 0–2)
BILIRUB SERPL-MCNC: 0.2 MG/DL (ref 0.2–1.3)
BUN SERPL-MCNC: 16 MG/DL (ref 7–18)
CALCIUM SERPL-MCNC: 8.8 MG/DL (ref 8.5–10.1)
CHLORIDE SERPL-SCNC: 103 MMOL/L (ref 98–107)
CO2 SERPL-SCNC: 33.7 MMOL/L (ref 21–32)
CREAT SERPL-MCNC: 0.9 MG/DL (ref 0.6–1.3)
EOSINOPHIL NFR BLD: 0.3 % (ref 0–7)
ERYTHROCYTE [DISTWIDTH] IN BLOOD BY AUTOMATED COUNT: 13.9 % (ref 11.5–14.5)
GLOBULIN SER-MCNC: 3 G/L
GLUCOSE SERPL-MCNC: 89 MG/DL (ref 74–106)
HCT VFR BLD CALC: 35.3 % (ref 36–48)
HGB BLD-MCNC: 11.9 G/DL (ref 12–16)
IMM GRANULOCYTES NFR BLD: 0.2 % (ref 0–5)
LYMPHOCYTES NFR BLD AUTO: 20.3 % (ref 15–50)
MCH RBC QN AUTO: 30.6 PG (ref 26–34)
MCHC RBC AUTO-ENTMCNC: 33.7 G/DL (ref 31–37)
MCV RBC: 90.7 FL (ref 80–100)
MONOCYTES NFR BLD: 7 % (ref 2–11)
NEUTROPHILS NFR BLD AUTO: 72.1 % (ref 40–80)
OSMOLALITY SERPL CALC.SUM OF ELEC: 278 MOSM/KG (ref 275–300)
PLATELET # BLD: 256 10X3/UL (ref 130–400)
PMV BLD AUTO: 10.3 FL (ref 7.4–10.4)
POTASSIUM SERPL-SCNC: 3.3 MMOL/L (ref 3.5–5.1)
PROT SERPL-MCNC: 6.2 G/DL (ref 6.4–8.2)
RBC # BLD AUTO: 3.89 10X6/UL (ref 4–5.4)
SODIUM SERPL-SCNC: 140 MMOL/L (ref 136–145)
WBC # BLD AUTO: 12.4 10X3/UL (ref 4.8–10.8)

## 2017-06-30 NOTE — NUR
PT IS AWAKE ON THE SIDE OF THE BED.  OS AT 2L VIS NC.  IV TO RIGHT WRSIT, SL.
PT STATES SHE IS READY TO GO HOME.  BED AT LOWEST POSITION, CALL BELL IN
USE/REACH, SIDE RAILS UP X2.  WILL CONTINUE TO MONITOR

## 2017-06-30 NOTE — NUR
Patient Name: CRYSTAL RANGEL
Encounter No: [id number]
: 1945
Primary Insurance: [employer] MEDICARE ADV
Anticipated DC Date: 2017
Planned Disposition: Home with Home Health
External Planned Provider: [facility]
 
 
DCP follow-up note: CM CALLED [facility] [phone], SPOKE TO
YEISON TO RESUME HOME HEALTH. CM FAXED DISCHARGE INFORMATION TO [place] AT
[phone]. PT NOTIFIED. FAMILY TO PROVIDE TRANSPORTATION HOME. NO FURTHER
NEEDS IDENTIFIED.
 
: Aubrey Stoll

## 2017-06-30 NOTE — NUR
D/C - VERBAL AND WRITTEN D/C INSTRUCTIONS GIVEN TO PT.  IV TO RIGHT FA D/C,
PT TOLERATED WELL.  CATH TIP INTACT.  HEART MONITOR REMOVED AND RETURNED TO
MONITOR TECH.  PT IS WAITING ON A RIDE HOME.  WILL CONTINUE TO MONITOR

## 2018-10-02 ENCOUNTER — HOSPITAL ENCOUNTER (OUTPATIENT)
Dept: HOSPITAL 84 - D.ER | Age: 73
Setting detail: OBSERVATION
LOS: 2 days | Discharge: HOME | End: 2018-10-04
Attending: FAMILY MEDICINE | Admitting: FAMILY MEDICINE
Payer: MEDICARE

## 2018-10-02 VITALS
WEIGHT: 153.92 LBS | WEIGHT: 153.92 LBS | HEIGHT: 65 IN | BODY MASS INDEX: 25.65 KG/M2 | HEIGHT: 65 IN | BODY MASS INDEX: 25.65 KG/M2 | BODY MASS INDEX: 25.65 KG/M2

## 2018-10-02 VITALS — DIASTOLIC BLOOD PRESSURE: 64 MMHG | SYSTOLIC BLOOD PRESSURE: 117 MMHG

## 2018-10-02 VITALS — DIASTOLIC BLOOD PRESSURE: 63 MMHG | SYSTOLIC BLOOD PRESSURE: 104 MMHG

## 2018-10-02 VITALS — DIASTOLIC BLOOD PRESSURE: 78 MMHG | SYSTOLIC BLOOD PRESSURE: 114 MMHG

## 2018-10-02 VITALS — DIASTOLIC BLOOD PRESSURE: 55 MMHG | SYSTOLIC BLOOD PRESSURE: 100 MMHG

## 2018-10-02 VITALS — SYSTOLIC BLOOD PRESSURE: 111 MMHG | DIASTOLIC BLOOD PRESSURE: 65 MMHG

## 2018-10-02 DIAGNOSIS — M19.90: ICD-10-CM

## 2018-10-02 DIAGNOSIS — G47.00: ICD-10-CM

## 2018-10-02 DIAGNOSIS — K21.9: ICD-10-CM

## 2018-10-02 DIAGNOSIS — E78.5: ICD-10-CM

## 2018-10-02 DIAGNOSIS — F32.9: ICD-10-CM

## 2018-10-02 DIAGNOSIS — M54.5: ICD-10-CM

## 2018-10-02 DIAGNOSIS — F41.9: ICD-10-CM

## 2018-10-02 DIAGNOSIS — T42.4X1A: Primary | ICD-10-CM

## 2018-10-02 DIAGNOSIS — J44.9: ICD-10-CM

## 2018-10-02 DIAGNOSIS — R40.0: ICD-10-CM

## 2018-10-02 DIAGNOSIS — M10.9: ICD-10-CM

## 2018-10-02 LAB
ALBUMIN SERPL-MCNC: 3.4 G/DL (ref 3.4–5)
ALP SERPL-CCNC: 52 U/L (ref 46–116)
ALT SERPL-CCNC: 15 U/L (ref 10–68)
AMPHETAMINES UR QL SCN: NEGATIVE QUAL
ANION GAP SERPL CALC-SCNC: 4.1 MMOL/L (ref 8–16)
APAP SERPL-MCNC: 0 UG/ML (ref 10–30)
APPEARANCE UR: CLEAR
BARBITURATES UR QL SCN: NEGATIVE QUAL
BASOPHILS NFR BLD AUTO: 0.2 % (ref 0–2)
BENZODIAZ UR QL SCN: NEGATIVE QUAL
BILIRUB SERPL-MCNC: 0.46 MG/DL (ref 0.2–1.3)
BILIRUB SERPL-MCNC: NEGATIVE MG/DL
BUN SERPL-MCNC: 14 MG/DL (ref 7–18)
BZE UR QL SCN: NEGATIVE QUAL
CALCIUM SERPL-MCNC: 8.4 MG/DL (ref 8.5–10.1)
CANNABINOIDS UR QL SCN: NEGATIVE QUAL
CHLORIDE SERPL-SCNC: 102 MMOL/L (ref 98–107)
CO2 SERPL-SCNC: 36.6 MMOL/L (ref 21–32)
COLOR UR: YELLOW
CREAT SERPL-MCNC: 0.9 MG/DL (ref 0.6–1.3)
EOSINOPHIL NFR BLD: 2.8 % (ref 0–7)
ERYTHROCYTE [DISTWIDTH] IN BLOOD BY AUTOMATED COUNT: 13 % (ref 11.5–14.5)
ETHANOL SERPL-MCNC: 1 MG/DL (ref 0–10)
GLOBULIN SER-MCNC: 2.8 G/L
GLUCOSE SERPL-MCNC: 82 MG/DL (ref 74–106)
GLUCOSE SERPL-MCNC: NEGATIVE MG/DL
HCT VFR BLD CALC: 36.5 % (ref 36–48)
HGB BLD-MCNC: 12.2 G/DL (ref 12–16)
IMM GRANULOCYTES NFR BLD: 0.3 % (ref 0–5)
KETONES UR STRIP-MCNC: NEGATIVE MG/DL
LYMPHOCYTES NFR BLD AUTO: 28.3 % (ref 15–50)
MAGNESIUM SERPL-MCNC: 1.7 MG/DL (ref 1.8–2.4)
MCH RBC QN AUTO: 30.4 PG (ref 26–34)
MCHC RBC AUTO-ENTMCNC: 33.4 G/DL (ref 31–37)
MCV RBC: 91 FL (ref 80–100)
MONOCYTES NFR BLD: 9.2 % (ref 2–11)
NEUTROPHILS NFR BLD AUTO: 59.2 % (ref 40–80)
NITRITE UR-MCNC: NEGATIVE MG/ML
OPIATES UR QL SCN: NEGATIVE QUAL
OSMOLALITY SERPL CALC.SUM OF ELEC: 277 MOSM/KG (ref 275–300)
PCP UR QL SCN: NEGATIVE QUAL
PH UR STRIP: 7 [PH] (ref 5–6)
PLATELET # BLD: 254 10X3/UL (ref 130–400)
PMV BLD AUTO: 9.2 FL (ref 7.4–10.4)
POTASSIUM SERPL-SCNC: 3.7 MMOL/L (ref 3.5–5.1)
PROT SERPL-MCNC: 6.2 G/DL (ref 6.4–8.2)
PROT UR-MCNC: NEGATIVE MG/DL
RBC # BLD AUTO: 4.01 10X6/UL (ref 4–5.4)
SODIUM SERPL-SCNC: 139 MMOL/L (ref 136–145)
SP GR UR STRIP: 1.01 (ref 1–1.02)
UROBILINOGEN UR-MCNC: NORMAL MG/DL
WBC # BLD AUTO: 9.6 10X3/UL (ref 4.8–10.8)

## 2018-10-03 VITALS — DIASTOLIC BLOOD PRESSURE: 75 MMHG | SYSTOLIC BLOOD PRESSURE: 111 MMHG

## 2018-10-03 VITALS — DIASTOLIC BLOOD PRESSURE: 47 MMHG | SYSTOLIC BLOOD PRESSURE: 97 MMHG

## 2018-10-03 VITALS — DIASTOLIC BLOOD PRESSURE: 55 MMHG | SYSTOLIC BLOOD PRESSURE: 100 MMHG

## 2018-10-03 VITALS — SYSTOLIC BLOOD PRESSURE: 100 MMHG | DIASTOLIC BLOOD PRESSURE: 55 MMHG

## 2018-10-03 VITALS — SYSTOLIC BLOOD PRESSURE: 105 MMHG | DIASTOLIC BLOOD PRESSURE: 48 MMHG

## 2018-10-03 VITALS — SYSTOLIC BLOOD PRESSURE: 119 MMHG | DIASTOLIC BLOOD PRESSURE: 67 MMHG

## 2018-10-03 VITALS — DIASTOLIC BLOOD PRESSURE: 55 MMHG | SYSTOLIC BLOOD PRESSURE: 116 MMHG

## 2018-10-03 LAB
ANION GAP SERPL CALC-SCNC: 3.9 MMOL/L (ref 8–16)
BASOPHILS NFR BLD AUTO: 0.1 % (ref 0–2)
BUN SERPL-MCNC: 9 MG/DL (ref 7–18)
CALCIUM SERPL-MCNC: 8.4 MG/DL (ref 8.5–10.1)
CHLORIDE SERPL-SCNC: 106 MMOL/L (ref 98–107)
CO2 SERPL-SCNC: 34.6 MMOL/L (ref 21–32)
CREAT SERPL-MCNC: 0.7 MG/DL (ref 0.6–1.3)
EOSINOPHIL NFR BLD: 4.6 % (ref 0–7)
ERYTHROCYTE [DISTWIDTH] IN BLOOD BY AUTOMATED COUNT: 12.9 % (ref 11.5–14.5)
GLUCOSE SERPL-MCNC: 80 MG/DL (ref 74–106)
HCT VFR BLD CALC: 36.7 % (ref 36–48)
HGB BLD-MCNC: 12.4 G/DL (ref 12–16)
IMM GRANULOCYTES NFR BLD: 0.3 % (ref 0–5)
LYMPHOCYTES NFR BLD AUTO: 27.8 % (ref 15–50)
MCH RBC QN AUTO: 30.8 PG (ref 26–34)
MCHC RBC AUTO-ENTMCNC: 33.8 G/DL (ref 31–37)
MCV RBC: 91.1 FL (ref 80–100)
MONOCYTES NFR BLD: 10.5 % (ref 2–11)
NEUTROPHILS NFR BLD AUTO: 56.7 % (ref 40–80)
OSMOLALITY SERPL CALC.SUM OF ELEC: 278 MOSM/KG (ref 275–300)
PLATELET # BLD: 248 10X3/UL (ref 130–400)
PMV BLD AUTO: 9.8 FL (ref 7.4–10.4)
POTASSIUM SERPL-SCNC: 3.5 MMOL/L (ref 3.5–5.1)
RBC # BLD AUTO: 4.03 10X6/UL (ref 4–5.4)
SODIUM SERPL-SCNC: 141 MMOL/L (ref 136–145)
T4 FREE SERPL-MCNC: 1.13 NG/DL (ref 0.76–1.46)
TSH SERPL-ACNC: 0.97 UIU/ML (ref 0.36–3.74)
WBC # BLD AUTO: 6.8 10X3/UL (ref 4.8–10.8)

## 2018-10-04 VITALS — DIASTOLIC BLOOD PRESSURE: 41 MMHG | SYSTOLIC BLOOD PRESSURE: 114 MMHG

## 2018-10-04 VITALS — DIASTOLIC BLOOD PRESSURE: 61 MMHG | SYSTOLIC BLOOD PRESSURE: 113 MMHG

## 2018-10-04 VITALS — DIASTOLIC BLOOD PRESSURE: 55 MMHG | SYSTOLIC BLOOD PRESSURE: 121 MMHG

## 2018-10-04 LAB
FOLATE SERPL-MCNC: >20 NG/ML (ref 3–?)
VIT B12 SERPL-MCNC: 453 PG/ML (ref 232–1245)